# Patient Record
Sex: MALE | Race: WHITE | Employment: FULL TIME | URBAN - METROPOLITAN AREA
[De-identification: names, ages, dates, MRNs, and addresses within clinical notes are randomized per-mention and may not be internally consistent; named-entity substitution may affect disease eponyms.]

---

## 2017-03-22 VITALS — BODY MASS INDEX: 42.66 KG/M2 | HEIGHT: 72 IN | WEIGHT: 315 LBS

## 2017-03-22 RX ORDER — IBUPROFEN 200 MG
200 TABLET ORAL
COMMUNITY
End: 2017-03-30

## 2017-03-22 NOTE — PERIOP NOTES
Patient verified name, , and surgery as listed in [facility]. Type 1B surgery, PAT phone assessment complete. Orders NOT received. Labs per surgeon: No orders received at this time. Labs per anesthesia protocol: None per anesthesia protocol. Patient answered medical/surgical history questions at their best of ability. All prior to admission medications documented in [facility]. Patient instructed to take the following medications the day of surgery according to anesthesia guidelines with a small sip of water: None. Hold all vitamins 7 days prior to surgery and NSAIDS 5 days prior to surgery. Medications to be held: Ibuprofen. Patient instructed on the following and verbalizes understanding:  Arrive at [facility], time of arrival to be called the day before by 1700  NPO after midnight including gum, mints, and ice chips  Responsible adult must drive patient to the hospital, stay during surgery, and patient will  need supervision 24 hours after anesthesia  Use antibacterial soap and Hibiclens in shower the night before surgery and on the morning of surgery  Leave all valuables(money and jewelry) at home but bring insurance card and ID on DOS  Do not wear make-up, nail polish, lotions, cologne, perfumes, powders, or oil on skin.

## 2017-03-23 ENCOUNTER — HOSPITAL ENCOUNTER (OUTPATIENT)
Dept: SURGERY | Age: 37
Discharge: HOME | End: 2017-03-23

## 2017-03-29 ENCOUNTER — ANESTHESIA EVENT (OUTPATIENT)
Dept: SURGERY | Age: 37
End: 2017-03-29
Payer: COMMERCIAL

## 2017-03-29 RX ORDER — FENTANYL CITRATE 50 UG/ML
100 INJECTION, SOLUTION INTRAMUSCULAR; INTRAVENOUS AS NEEDED
Status: CANCELLED | OUTPATIENT
Start: 2017-03-29

## 2017-03-29 RX ORDER — SODIUM CHLORIDE, SODIUM LACTATE, POTASSIUM CHLORIDE, CALCIUM CHLORIDE 600; 310; 30; 20 MG/100ML; MG/100ML; MG/100ML; MG/100ML
1000 INJECTION, SOLUTION INTRAVENOUS CONTINUOUS
Status: CANCELLED | OUTPATIENT
Start: 2017-03-29 | End: 2017-03-30

## 2017-03-29 RX ORDER — SODIUM CHLORIDE 0.9 % (FLUSH) 0.9 %
5-10 SYRINGE (ML) INJECTION AS NEEDED
Status: CANCELLED | OUTPATIENT
Start: 2017-03-29

## 2017-03-29 RX ORDER — SODIUM CHLORIDE 0.9 % (FLUSH) 0.9 %
5-10 SYRINGE (ML) INJECTION EVERY 8 HOURS
Status: CANCELLED | OUTPATIENT
Start: 2017-03-29

## 2017-03-29 RX ORDER — FAMOTIDINE 10 MG/ML
20 INJECTION INTRAVENOUS ONCE
Status: CANCELLED | OUTPATIENT
Start: 2017-03-29 | End: 2017-03-29

## 2017-03-29 RX ORDER — FAMOTIDINE 20 MG/1
20 TABLET, FILM COATED ORAL ONCE
Status: CANCELLED | OUTPATIENT
Start: 2017-03-29 | End: 2017-03-29

## 2017-03-30 ENCOUNTER — ANESTHESIA (OUTPATIENT)
Dept: SURGERY | Age: 37
End: 2017-03-30
Payer: COMMERCIAL

## 2017-03-30 ENCOUNTER — HOSPITAL ENCOUNTER (OUTPATIENT)
Age: 37
Setting detail: OUTPATIENT SURGERY
Discharge: HOME | End: 2017-03-30
Attending: OTOLARYNGOLOGY | Admitting: OTOLARYNGOLOGY
Payer: COMMERCIAL

## 2017-03-30 VITALS
DIASTOLIC BLOOD PRESSURE: 76 MMHG | HEART RATE: 67 BPM | TEMPERATURE: 97.9 F | WEIGHT: 315 LBS | OXYGEN SATURATION: 95 % | RESPIRATION RATE: 16 BRPM | SYSTOLIC BLOOD PRESSURE: 136 MMHG | HEIGHT: 72 IN | BODY MASS INDEX: 42.66 KG/M2

## 2017-03-30 LAB — GLUCOSE BLD STRIP.AUTO-MCNC: 74 MG/DL (ref 65–100)

## 2017-03-30 PROCEDURE — 77030008703 HC TU ET UNCUF COVD -A: Performed by: ANESTHESIOLOGY

## 2017-03-30 PROCEDURE — 77030002916 HC SUT ETHLN J&J -A: Performed by: OTOLARYNGOLOGY

## 2017-03-30 PROCEDURE — 74011000250 HC RX REV CODE- 250

## 2017-03-30 PROCEDURE — 74011250637 HC RX REV CODE- 250/637: Performed by: OTOLARYNGOLOGY

## 2017-03-30 PROCEDURE — 77030011640 HC PAD GRND REM COVD -A: Performed by: OTOLARYNGOLOGY

## 2017-03-30 PROCEDURE — 76210000020 HC REC RM PH II FIRST 0.5 HR: Performed by: OTOLARYNGOLOGY

## 2017-03-30 PROCEDURE — 74011250636 HC RX REV CODE- 250/636: Performed by: ANESTHESIOLOGY

## 2017-03-30 PROCEDURE — 74011250637 HC RX REV CODE- 250/637: Performed by: ANESTHESIOLOGY

## 2017-03-30 PROCEDURE — 74011000250 HC RX REV CODE- 250: Performed by: OTOLARYNGOLOGY

## 2017-03-30 PROCEDURE — 76210000016 HC OR PH I REC 1 TO 1.5 HR: Performed by: OTOLARYNGOLOGY

## 2017-03-30 PROCEDURE — 77030008357 HC SPLNT NSL INT THOM -B: Performed by: OTOLARYNGOLOGY

## 2017-03-30 PROCEDURE — 74011000250 HC RX REV CODE- 250: Performed by: ANESTHESIOLOGY

## 2017-03-30 PROCEDURE — 77030018836 HC SOL IRR NACL ICUM -A: Performed by: OTOLARYNGOLOGY

## 2017-03-30 PROCEDURE — 74011250636 HC RX REV CODE- 250/636

## 2017-03-30 PROCEDURE — 76010000149 HC OR TIME 1 TO 1.5 HR: Performed by: OTOLARYNGOLOGY

## 2017-03-30 PROCEDURE — 77030002888 HC SUT CHRMC J&J -A: Performed by: OTOLARYNGOLOGY

## 2017-03-30 PROCEDURE — 77030028234 HC DEV PEAK PLSMBLD MEDT -B: Performed by: OTOLARYNGOLOGY

## 2017-03-30 PROCEDURE — 76060000033 HC ANESTHESIA 1 TO 1.5 HR: Performed by: OTOLARYNGOLOGY

## 2017-03-30 PROCEDURE — 77030027401 HC DEV TISS COAG PLSMBLD MEDT -C: Performed by: OTOLARYNGOLOGY

## 2017-03-30 PROCEDURE — 82962 GLUCOSE BLOOD TEST: CPT

## 2017-03-30 PROCEDURE — 77030012840 HC ELECTRD COAG SUC CNMD -C: Performed by: OTOLARYNGOLOGY

## 2017-03-30 RX ORDER — FENTANYL CITRATE 50 UG/ML
INJECTION, SOLUTION INTRAMUSCULAR; INTRAVENOUS AS NEEDED
Status: DISCONTINUED | OUTPATIENT
Start: 2017-03-30 | End: 2017-03-30 | Stop reason: HOSPADM

## 2017-03-30 RX ORDER — ROCURONIUM BROMIDE 10 MG/ML
INJECTION, SOLUTION INTRAVENOUS AS NEEDED
Status: DISCONTINUED | OUTPATIENT
Start: 2017-03-30 | End: 2017-03-30 | Stop reason: HOSPADM

## 2017-03-30 RX ORDER — ONDANSETRON 2 MG/ML
INJECTION INTRAMUSCULAR; INTRAVENOUS AS NEEDED
Status: DISCONTINUED | OUTPATIENT
Start: 2017-03-30 | End: 2017-03-30 | Stop reason: HOSPADM

## 2017-03-30 RX ORDER — LIDOCAINE HYDROCHLORIDE 20 MG/ML
INJECTION, SOLUTION EPIDURAL; INFILTRATION; INTRACAUDAL; PERINEURAL AS NEEDED
Status: DISCONTINUED | OUTPATIENT
Start: 2017-03-30 | End: 2017-03-30 | Stop reason: HOSPADM

## 2017-03-30 RX ORDER — SODIUM CHLORIDE 0.9 % (FLUSH) 0.9 %
5-10 SYRINGE (ML) INJECTION AS NEEDED
Status: DISCONTINUED | OUTPATIENT
Start: 2017-03-30 | End: 2017-03-30 | Stop reason: HOSPADM

## 2017-03-30 RX ORDER — BACITRACIN ZINC 500 UNIT/G
OINTMENT (GRAM) TOPICAL AS NEEDED
Status: DISCONTINUED | OUTPATIENT
Start: 2017-03-30 | End: 2017-03-30 | Stop reason: HOSPADM

## 2017-03-30 RX ORDER — OXYCODONE AND ACETAMINOPHEN 5; 325 MG/1; MG/1
1 TABLET ORAL AS NEEDED
Status: DISCONTINUED | OUTPATIENT
Start: 2017-03-30 | End: 2017-03-30 | Stop reason: HOSPADM

## 2017-03-30 RX ORDER — LIDOCAINE HYDROCHLORIDE 10 MG/ML
0.1 INJECTION INFILTRATION; PERINEURAL AS NEEDED
Status: DISCONTINUED | OUTPATIENT
Start: 2017-03-30 | End: 2017-03-30 | Stop reason: SDUPTHER

## 2017-03-30 RX ORDER — SODIUM CHLORIDE, SODIUM LACTATE, POTASSIUM CHLORIDE, CALCIUM CHLORIDE 600; 310; 30; 20 MG/100ML; MG/100ML; MG/100ML; MG/100ML
75 INJECTION, SOLUTION INTRAVENOUS CONTINUOUS
Status: DISCONTINUED | OUTPATIENT
Start: 2017-03-30 | End: 2017-03-30 | Stop reason: HOSPADM

## 2017-03-30 RX ORDER — FAMOTIDINE 20 MG/1
20 TABLET, FILM COATED ORAL ONCE
Status: COMPLETED | OUTPATIENT
Start: 2017-03-30 | End: 2017-03-30

## 2017-03-30 RX ORDER — NALOXONE HYDROCHLORIDE 0.4 MG/ML
0.2 INJECTION, SOLUTION INTRAMUSCULAR; INTRAVENOUS; SUBCUTANEOUS AS NEEDED
Status: DISCONTINUED | OUTPATIENT
Start: 2017-03-30 | End: 2017-03-30 | Stop reason: HOSPADM

## 2017-03-30 RX ORDER — PROPOFOL 10 MG/ML
INJECTION, EMULSION INTRAVENOUS AS NEEDED
Status: DISCONTINUED | OUTPATIENT
Start: 2017-03-30 | End: 2017-03-30 | Stop reason: HOSPADM

## 2017-03-30 RX ORDER — LIDOCAINE HYDROCHLORIDE 10 MG/ML
0.1 INJECTION INFILTRATION; PERINEURAL AS NEEDED
Status: DISCONTINUED | OUTPATIENT
Start: 2017-03-30 | End: 2017-03-30 | Stop reason: HOSPADM

## 2017-03-30 RX ORDER — SUCCINYLCHOLINE CHLORIDE 20 MG/ML
INJECTION INTRAMUSCULAR; INTRAVENOUS AS NEEDED
Status: DISCONTINUED | OUTPATIENT
Start: 2017-03-30 | End: 2017-03-30 | Stop reason: HOSPADM

## 2017-03-30 RX ORDER — DEXAMETHASONE SODIUM PHOSPHATE 4 MG/ML
INJECTION, SOLUTION INTRA-ARTICULAR; INTRALESIONAL; INTRAMUSCULAR; INTRAVENOUS; SOFT TISSUE AS NEEDED
Status: DISCONTINUED | OUTPATIENT
Start: 2017-03-30 | End: 2017-03-30 | Stop reason: HOSPADM

## 2017-03-30 RX ORDER — MIDAZOLAM HYDROCHLORIDE 1 MG/ML
2 INJECTION, SOLUTION INTRAMUSCULAR; INTRAVENOUS
Status: DISCONTINUED | OUTPATIENT
Start: 2017-03-30 | End: 2017-03-30 | Stop reason: HOSPADM

## 2017-03-30 RX ORDER — HYDROMORPHONE HYDROCHLORIDE 2 MG/ML
0.5 INJECTION, SOLUTION INTRAMUSCULAR; INTRAVENOUS; SUBCUTANEOUS
Status: DISCONTINUED | OUTPATIENT
Start: 2017-03-30 | End: 2017-03-30 | Stop reason: HOSPADM

## 2017-03-30 RX ORDER — ACETAMINOPHEN 500 MG
500 TABLET ORAL ONCE
Status: DISCONTINUED | OUTPATIENT
Start: 2017-03-30 | End: 2017-03-30 | Stop reason: HOSPADM

## 2017-03-30 RX ORDER — DIPHENHYDRAMINE HYDROCHLORIDE 50 MG/ML
12.5 INJECTION, SOLUTION INTRAMUSCULAR; INTRAVENOUS ONCE
Status: DISCONTINUED | OUTPATIENT
Start: 2017-03-30 | End: 2017-03-30 | Stop reason: HOSPADM

## 2017-03-30 RX ORDER — ONDANSETRON 2 MG/ML
4 INJECTION INTRAMUSCULAR; INTRAVENOUS ONCE
Status: DISCONTINUED | OUTPATIENT
Start: 2017-03-30 | End: 2017-03-30 | Stop reason: HOSPADM

## 2017-03-30 RX ORDER — OXYMETAZOLINE HCL 0.05 %
SPRAY, NON-AEROSOL (ML) NASAL AS NEEDED
Status: DISCONTINUED | OUTPATIENT
Start: 2017-03-30 | End: 2017-03-30 | Stop reason: HOSPADM

## 2017-03-30 RX ORDER — LIDOCAINE HYDROCHLORIDE AND EPINEPHRINE 10; 10 MG/ML; UG/ML
INJECTION, SOLUTION INFILTRATION; PERINEURAL AS NEEDED
Status: DISCONTINUED | OUTPATIENT
Start: 2017-03-30 | End: 2017-03-30 | Stop reason: HOSPADM

## 2017-03-30 RX ADMIN — SODIUM CHLORIDE, SODIUM LACTATE, POTASSIUM CHLORIDE, AND CALCIUM CHLORIDE 75 ML/HR: 600; 310; 30; 20 INJECTION, SOLUTION INTRAVENOUS at 06:30

## 2017-03-30 RX ADMIN — FENTANYL CITRATE 50 MCG: 50 INJECTION, SOLUTION INTRAMUSCULAR; INTRAVENOUS at 08:00

## 2017-03-30 RX ADMIN — ONDANSETRON 4 MG: 2 INJECTION INTRAMUSCULAR; INTRAVENOUS at 07:45

## 2017-03-30 RX ADMIN — OXYCODONE HYDROCHLORIDE AND ACETAMINOPHEN 1 TABLET: 5; 325 TABLET ORAL at 09:28

## 2017-03-30 RX ADMIN — FAMOTIDINE 20 MG: 20 TABLET, FILM COATED ORAL at 06:30

## 2017-03-30 RX ADMIN — HYDROMORPHONE HYDROCHLORIDE 0.5 MG: 2 INJECTION, SOLUTION INTRAMUSCULAR; INTRAVENOUS; SUBCUTANEOUS at 08:48

## 2017-03-30 RX ADMIN — LIDOCAINE HYDROCHLORIDE 100 MG: 20 INJECTION, SOLUTION EPIDURAL; INFILTRATION; INTRACAUDAL; PERINEURAL at 07:27

## 2017-03-30 RX ADMIN — SODIUM CHLORIDE, SODIUM LACTATE, POTASSIUM CHLORIDE, AND CALCIUM CHLORIDE: 600; 310; 30; 20 INJECTION, SOLUTION INTRAVENOUS at 07:51

## 2017-03-30 RX ADMIN — MIDAZOLAM HYDROCHLORIDE 2 MG: 1 INJECTION, SOLUTION INTRAMUSCULAR; INTRAVENOUS at 07:05

## 2017-03-30 RX ADMIN — PROPOFOL 200 MG: 10 INJECTION, EMULSION INTRAVENOUS at 07:27

## 2017-03-30 RX ADMIN — HYDROMORPHONE HYDROCHLORIDE 0.5 MG: 2 INJECTION, SOLUTION INTRAMUSCULAR; INTRAVENOUS; SUBCUTANEOUS at 08:54

## 2017-03-30 RX ADMIN — SUCCINYLCHOLINE CHLORIDE 200 MG: 20 INJECTION INTRAMUSCULAR; INTRAVENOUS at 07:27

## 2017-03-30 RX ADMIN — LIDOCAINE HYDROCHLORIDE 0.1 ML: 10 INJECTION, SOLUTION INFILTRATION; PERINEURAL at 06:29

## 2017-03-30 RX ADMIN — DEXAMETHASONE SODIUM PHOSPHATE 10 MG: 4 INJECTION, SOLUTION INTRA-ARTICULAR; INTRALESIONAL; INTRAMUSCULAR; INTRAVENOUS; SOFT TISSUE at 07:45

## 2017-03-30 RX ADMIN — FENTANYL CITRATE 100 MCG: 50 INJECTION, SOLUTION INTRAMUSCULAR; INTRAVENOUS at 07:17

## 2017-03-30 RX ADMIN — ROCURONIUM BROMIDE 5 MG: 10 INJECTION, SOLUTION INTRAVENOUS at 07:27

## 2017-03-30 NOTE — DISCHARGE INSTRUCTIONS
Nasal Septum Repair: What to Expect at [address] may have some swelling of your nose, upper lip, cheeks, or around your eyes after nasal surgery. You may have some bruises around your nose and eyes. Your nose may be sore and will bleed. This may last for several days after surgery. The tip of your nose and your upper lip and gums may be numb. Feeling will return in a few weeks to a few months. Your sense of smell may not be as good after surgery. But it will improve and will often return to normal in 1 to 2 months. You will have a drip pad under your nose to collect mucus and blood. Change it only when it bleeds through. You may have to do this every hour for 24 hours after surgery. You will probably be able to return to work or school in a few days and to your normal routine in about 3 weeks. But this varies with your job and how much surgery you had. Most people recover fully in 1 to 2 months. You will have to visit your doctor during the 3 to 4 months after your surgery. Your doctor will check to see that your nose is healing well. This care sheet gives you a general idea about how long it will take for you to recover. But each person recovers at a different pace. Follow the steps below to get better as quickly as possible. How can you care for yourself at home? Activity  · Rest when you feel tired. Getting enough sleep will help you recover. Do not lie flat. Raise your head with two or three pillows. This can reduce swelling. Try to sleep on your back for the month after surgery. You can also sleep in a reclining chair. · Try to walk each day. Start by walking a little more than you did the day before. Bit by bit, increase the amount you walk. Walking boosts blood flow and helps prevent pneumonia and constipation. Also, try to sit and stand as much as you can. · For 1 week, try not to bend over or lift anything heavier than 10 pounds.  This may include a child, heavy grocery bags and milk containers, a heavy briefcase or backpack, cat litter or dog food bags, or a vacuum . · You can take a shower or bath. Avoid swimming for 6 weeks. · Avoid strenuous activities, such as bicycle riding, jogging, weight lifting, or aerobic exercise, for 1 week or until your doctor says it is okay. · You may drive when you are no longer taking prescription pain pills and feel up to it. Diet  · You can eat your normal diet. If your stomach is upset, try bland, low-fat foods like plain rice, broiled chicken, toast, and yogurt. · You may notice that your bowel movements are not regular right after your surgery. This is common. Try to avoid constipation and straining with bowel movements. You may want to take a fiber supplement every day. If you have not had a bowel movement after a couple of days, ask your doctor about taking a mild laxative. Medicines  · Your doctor will tell you if and when you can restart your medicines. He or she will also give you instructions about taking any new medicines. · If you take blood thinners, such as warfarin (Coumadin), clopidogrel (Plavix), or aspirin, be sure to talk to your doctor. He or she will tell you if and when to start taking those medicines again. Make sure that you understand exactly what your doctor wants you to do. · Do not take aspirin, aspirin-containing medicines, or anti-inflammatory medicines such as ibuprofen (Advil, Motrin) or naproxen (Aleve) for 3 weeks following surgery unless your doctor says it is okay. · Take pain medicines exactly as directed. ¨ If the doctor gave you a prescription medicine for pain, take it as prescribed. ¨ Do not take two or more pain medicines at the same time unless the doctor told you to. Many pain medicines have acetaminophen, which is Tylenol. Too much acetaminophen (Tylenol) can be harmful. · If your doctor prescribed antibiotics, take them as directed. Do not stop taking them just because you feel better.  You need to take the full course of antibiotics. · If you think your pain medicine is making you sick to your stomach:  ¨ Take your medicine after meals (unless your doctor has told you not to). ¨ Ask your doctor for a different pain medicine. Incision care  · You will have a drip pad under your nose to collect blood. Change it only when it has bled through. You may have to do this every hour for 24 hours after surgery. When bleeding stops, you can remove it. · If you have packing in your nose, leave it in. Your doctor will take it out. Ice and elevation  · To help with swelling and pain, put ice or a cold pack on your nose for 10 to 20 minutes at a time. Put a thin cloth between the ice and your skin. · Sleep with your head raised up. You can also sleep in a reclining chair. Other instructions  · Do not blow your nose for 1 week after surgery. · Do not put anything into your nose. · If you must sneeze, open your mouth and sneeze naturally. · After any packing is removed, use saline (saltwater) nasal washes to help keep your nasal passages open and wash out mucus and bacteria. You can buy saline nose drops at a grocery store or drugstore. Or you can make your own at home by adding 1 teaspoon of salt and 1 teaspoon of baking soda to 2 cups of distilled water. If you make your own, fill a bulb syringe with the solution, insert the tip into your nostril, and squeeze gently. [place] your nose. · You can wear your glasses when you wish. Do not wear contacts until the day after the surgery. Follow-up care is a key part of your treatment and safety. Be sure to make and go to all appointments, and call your doctor if you are having problems. It's also a good idea to know your test results and keep a list of the medicines you take. When should you call for help? Call 911 anytime you think you may need emergency care. For example, call if:  · You passed out (lost consciousness).   · You have severe trouble breathing. Call your doctor now or seek immediate medical care if:  · The dressing soaks through with blood and needs to be changed more than every 15 minutes. · You have a fever with a stiff neck or a severe headache. · You are sensitive to light, or you feel very sleepy or confused. · You have pain that does not get better after you take pain pills. · You have a fever over 100°F.  · You have double or blurred vision, cannot close your eyes, or have eye pain. Watch closely for any changes in your health, and be sure to contact your doctor if:  · You do not have a bowel movement after taking a laxative. Where can you learn more? Go to http://[facility].info/. Enter K733 in the search box to learn more about \"Nasal Septum Repair: What to Expect at Home. \"  Current as of: July 29, 2016  Content Version: 11.2  © 7645-3742 [employer]. Care instructions adapted under license by [employer] (which disclaims liability or warranty for this information). If you have questions about a medical condition or this instruction, always ask your healthcare professional. [place] [address] any warranty or liability for your use of this information. Tonsillectomy: What to Expect at Home  Your Recovery  A tonsillectomy is surgery to remove the tonsils. Sometimes the adenoids are removed during the same surgery. The tonsils and adenoids are in the throat. Your doctor did the surgery through your mouth. Most adults have a lot of throat pain for 1 to 2 weeks or longer. The pain may get worse before it gets better. The pain in your throat can also make your ears hurt. You may have good days and bad days. Most people find that they have the most pain in the first 8 days. You probably will feel tired for 1 to 2 weeks. You may have bad breath for up to 2 weeks. You may be able to go back to work or your usual routine in 1 to 2 weeks.   There will be a white coating in your throat where the tonsils were. The coating is like a scab, and it usually starts to come off in 5 to 10 days. It is usually gone in 10 to 16 days. You may see some blood in your spit as the coating comes off. After surgery, you may snore or breathe through your mouth at night. This usually gets better 1 to 2 weeks after surgery. Mouth breathing can make your mouth and throat dry or sore. Place a humidifier by your bed when you sleep. This may make it easier for you to breathe. Follow the directions for cleaning the machine. At first, your voice may sound different. Your voice probably will return to normal in 2 to 6 weeks. It is common for people to lose weight after this surgery, because it can hurt to swallow food at first. As long as you are drinking plenty of liquids, this is okay. You will probably gain the weight back when you begin to eat normally again. This care sheet gives you a general idea about how long it will take for you to recover. But each person recovers at a different pace. Follow the steps below to get better as quickly as possible. How can you care for yourself at home? Activity  · Rest when you feel tired. Getting enough sleep will help you recover. · Try to walk each day. Start by walking a little more than you did the day before. Bit by bit, increase the amount you walk. Walking boosts blood flow and helps prevent pneumonia and constipation. · Avoid strenuous activities, such as bicycle riding, jogging, weight lifting, or aerobic exercise, for 2 weeks or until your doctor says it is okay. · For 2 weeks, avoid lifting anything that would make you strain. This may include a child, heavy grocery bags and milk containers, a heavy briefcase or backpack, cat litter or dog food bags, or a vacuum . · Avoid dirt, dust, and heat for 2 weeks after surgery. These things can irritate your throat.   · For about 1 week, try to avoid crowds or people who you know have a cold or the flu. This can help prevent you from getting an infection. · You may bathe as usual.  · Ask your doctor when you can drive again. · You will probably need to take 1 to 2 weeks off from work. It depends on the type of work you do and how you feel. Diet  · Drink plenty of fluids to avoid becoming dehydrated. · If it is painful to swallow, start out with Popsicles, ice cream, or cold or room-temperature drinks. Do not eat or drink red food items, such as red juice or red Jell-O. The color may make you think you are bleeding. Avoid hot drinks, soda pop, orange or tomato juice, and other acidic foods that can sting the throat. These may make throat pain worse and cause bleeding. · For 2 weeks, choose soft foods like pudding, yogurt, canned or cooked fruit, scrambled eggs, and mashed potatoes. Avoid eating hard or scratchy foods like chips or raw vegetables. · You may notice that your bowel movements are not regular right after your surgery. This is common. Try to avoid constipation and straining with bowel movements. You may want to take a fiber supplement every day. If you have not had a bowel movement after a couple of days, ask your doctor about taking a mild laxative. Medicines  · Your doctor will tell you if and when you can restart your medicines. He or she will also give you instructions about taking any new medicines. · If you take blood thinners, such as warfarin (Coumadin), clopidogrel (Plavix), or aspirin, be sure to talk to your doctor. He or she will tell you if and when to start taking those medicines again. Make sure that you understand exactly what your doctor wants you to do. · Be safe with medicines. Take pain medicines exactly as directed. ¨ If the doctor gave you a prescription medicine for pain, take it as prescribed. ¨ If you are not taking a prescription pain medicine, ask your doctor if you can take an over-the-counter medicine.   · If you think your pain medicine is making you sick to your stomach:  ¨ Take your pain medicine after meals (unless your doctor has told you not to). ¨ Ask your doctor for a different pain medicine. · If your doctor prescribed antibiotics, take them as directed. Do not stop taking them just because you feel better. You need to take the full course of antibiotics. Follow-up care is a key part of your treatment and safety. Be sure to make and go to all appointments, and call your doctor if you are having problems. It's also a good idea to know your test results and keep a list of the medicines you take. When should you call for help? Call 911 anytime you think you may need emergency care. For example, call if:  · You passed out (lost consciousness). · You have severe trouble breathing. Call your doctor now or seek immediate medical care if:  · You bleed from your mouth or nose. · You vomit blood. · You are sick to your stomach or cannot keep fluids down. · You have pain that does not get better after you take pain medicine. · You have a fever. · Your pain gets much worse. · You have signs of needing more fluids. You have sunken eyes and a dry mouth, and you pass only a little dark urine. Watch closely for changes in your health, and be sure to contact your doctor if you have any problems. Where can you learn more? Go to http://aleena-andrei.info/. Enter M163 in the search box to learn more about \"Tonsillectomy: What to Expect at Home. \"  Current as of: July 29, 2016  Content Version: 11.2  © 0180-7025 [employer]. Care instructions adapted under license by [employer] (which disclaims liability or warranty for this information). If you have questions about a medical condition or this instruction, always ask your healthcare professional. [address] any warranty or liability for your use of this information.

## 2017-03-30 NOTE — IP AVS SNAPSHOT
Enejamel Faizan 
 
 
 [address] 
[phone] Patient: Isaías Aguilar MRN: [id number] You are allergic to the following Allergen Reactions Morphine Other (comments) \"react strongly to it and was out of it for 2 days\" Pcn (Penicillins) Rash Recent Documentation Height Weight BMI Smoking Status 1.829 m (!) 163.5 kg 48.88 kg/m2 Never Smoker Emergency Contacts Name Discharge Info Relation Home Work Mobile Marci Finley  Spouse [3] [phone] About your hospitalization You were admitted on:  March 30, 2017 You last received care in the:  [address] [place] [address] PACU You were discharged on:  March 30, 2017 Unit phone number:  [phone] Why you were hospitalized Your primary diagnosis was:  Not on File Providers Seen During Your Hospitalizations Provider Role Specialty Primary office phone Hellen Quiroz MD Attending Provider Otolaryngology [phone] Your Primary Care Physician (PCP) Primary Care Physician Office Phone Office Fax Jean Paul Del Torolist [phone] Follow-up Information Follow up With Details Comments Contact Info Hellen Quiroz MD Go on 4/4/2017 at 9:55 AM 
 [address] [place] [address]  
[phone] Cyrus Ralph MD   [address] [place] [address] 
[phone] Your Appointments Tuesday April 04, 2017  9:55 AM EDT  
POST OP with Hellen Quiroz MD  
[facility]) [address] [place] [address],[address] [place] [address]  [id number] [phone] Current Discharge Medication List  
  
CONTINUE these medications which have NOT CHANGED Dose & Instructions Dispensing Information Comments Morning Noon Evening Bedtime  TYLENOL PM EXTRA STRENGTH PO  
   
 Your last dose was: Your next dose is:    
   
   
 Dose:  500 mg Take 500 mg by mouth as needed. Refills:  0 STOP taking these medications   
 ibuprofen 200 mg tablet Commonly known as:  MOTRIN Discharge Instructions Nasal Septum Repair: What to Expect at Home Your Recovery You may have some swelling of your nose, upper lip, cheeks, or around your eyes after nasal surgery. You may have some bruises around your nose and eyes. Your nose may be sore and will bleed. This may last for several days after surgery. The tip of your nose and your upper lip and gums may be numb. Feeling will return in a few weeks to a few months. Your sense of smell may not be as good after surgery. But it will improve and will often return to normal in 1 to 2 months. You will have a drip pad under your nose to collect mucus and blood. Change it only when it bleeds through. You may have to do this every hour for 24 hours after surgery. You will probably be able to return to work or school in a few days and to your normal routine in about 3 weeks. But this varies with your job and how much surgery you had. Most people recover fully in 1 to 2 months. You will have to visit your doctor during the 3 to 4 months after your surgery. Your doctor will check to see that your nose is healing well. This care sheet gives you a general idea about how long it will take for you to recover. But each person recovers at a different pace. Follow the steps below to get better as quickly as possible. How can you care for yourself at home? Activity · Rest when you feel tired. Getting enough sleep will help you recover. Do not lie flat. Raise your head with two or three pillows. This can reduce swelling. Try to sleep on your back for the month after surgery. You can also sleep in a reclining chair. · Try to walk each day.  Start by walking a little more than you did the day before. Bit by bit, increase the amount you walk. Walking boosts blood flow and helps prevent pneumonia and constipation. Also, try to sit and stand as much as you can. · For 1 week, try not to bend over or lift anything heavier than 10 pounds. This may include a child, heavy grocery bags and milk containers, a heavy briefcase or backpack, cat litter or dog food bags, or a vacuum . · You can take a shower or bath. Avoid swimming for 6 weeks. · Avoid strenuous activities, such as bicycle riding, jogging, weight lifting, or aerobic exercise, for 1 week or until your doctor says it is okay. · You may drive when you are no longer taking prescription pain pills and feel up to it. Diet · You can eat your normal diet. If your stomach is upset, try bland, low-fat foods like plain rice, broiled chicken, toast, and yogurt. · You may notice that your bowel movements are not regular right after your surgery. This is common. Try to avoid constipation and straining with bowel movements. You may want to take a fiber supplement every day. If you have not had a bowel movement after a couple of days, ask your doctor about taking a mild laxative. Medicines · Your doctor will tell you if and when you can restart your medicines. He or she will also give you instructions about taking any new medicines. · If you take blood thinners, such as warfarin (Coumadin), clopidogrel (Plavix), or aspirin, be sure to talk to your doctor. He or she will tell you if and when to start taking those medicines again. Make sure that you understand exactly what your doctor wants you to do. · Do not take aspirin, aspirin-containing medicines, or anti-inflammatory medicines such as ibuprofen (Advil, Motrin) or naproxen (Aleve) for 3 weeks following surgery unless your doctor says it is okay. · Take pain medicines exactly as directed. ¨ If the doctor gave you a prescription medicine for pain, take it as prescribed. ¨ Do not take two or more pain medicines at the same time unless the doctor told you to. Many pain medicines have acetaminophen, which is Tylenol. Too much acetaminophen (Tylenol) can be harmful. · If your doctor prescribed antibiotics, take them as directed. Do not stop taking them just because you feel better. You need to take the full course of antibiotics. · If you think your pain medicine is making you sick to your stomach: 
¨ Take your medicine after meals (unless your doctor has told you not to). ¨ Ask your doctor for a different pain medicine. Incision care · You will have a drip pad under your nose to collect blood. Change it only when it has bled through. You may have to do this every hour for 24 hours after surgery. When bleeding stops, you can remove it. · If you have packing in your nose, leave it in. Your doctor will take it out. Ice and elevation · To help with swelling and pain, put ice or a cold pack on your nose for 10 to 20 minutes at a time. Put a thin cloth between the ice and your skin. · Sleep with your head raised up. You can also sleep in a reclining chair. Other instructions · Do not blow your nose for 1 week after surgery. · Do not put anything into your nose. · If you must sneeze, open your mouth and sneeze naturally. · After any packing is removed, use saline (saltwater) nasal washes to help keep your nasal passages open and wash out mucus and bacteria. You can buy saline nose drops at a grocery store or drugstore. Or you can make your own at home by adding 1 teaspoon of salt and 1 teaspoon of baking soda to 2 cups of distilled water. If you make your own, fill a bulb syringe with the solution, insert the tip into your nostril, and squeeze gently. [place] Mace your nose. · You can wear your glasses when you wish. Do not wear contacts until the day after the surgery. Follow-up care is a key part of your treatment and safety.  Be sure to make and go to all appointments, and call your doctor if you are having problems. It's also a good idea to know your test results and keep a list of the medicines you take. When should you call for help? Call 911 anytime you think you may need emergency care. For example, call if: 
· You passed out (lost consciousness). · You have severe trouble breathing. Call your doctor now or seek immediate medical care if: · The dressing soaks through with blood and needs to be changed more than every 15 minutes. · You have a fever with a stiff neck or a severe headache. · You are sensitive to light, or you feel very sleepy or confused. · You have pain that does not get better after you take pain pills. · You have a fever over 100°F. 
· You have double or blurred vision, cannot close your eyes, or have eye pain. Watch closely for any changes in your health, and be sure to contact your doctor if: 
· You do not have a bowel movement after taking a laxative. Where can you learn more? Go to http://aleena-andrei.info/. Enter X595 in the search box to learn more about \"Nasal Septum Repair: What to Expect at Home. \" Current as of: July 29, 2016 Content Version: 11.2 © 8252-6708 [employer]. Care instructions adapted under license by [employer] (which disclaims liability or warranty for this information). If you have questions about a medical condition or this instruction, always ask your healthcare professional. [place] [address] any warranty or liability for your use of this information. Tonsillectomy: What to Expect at [facility] Your Recovery A tonsillectomy is surgery to remove the tonsils. Sometimes the adenoids are removed during the same surgery. The tonsils and adenoids are in the throat. Your doctor did the surgery through your mouth. Most adults have a lot of throat pain for 1 to 2 weeks or longer.  The pain may get worse before it gets better. The pain in your throat can also make your ears hurt. You may have good days and bad days. Most people find that they have the most pain in the first 8 days. You probably will feel tired for 1 to 2 weeks. You may have bad breath for up to 2 weeks. You may be able to go back to work or your usual routine in 1 to 2 weeks. There will be a white coating in your throat where the tonsils were. The coating is like a scab, and it usually starts to come off in 5 to 10 days. It is usually gone in 10 to 16 days. You may see some blood in your spit as the coating comes off. After surgery, you may snore or breathe through your mouth at night. This usually gets better 1 to 2 weeks after surgery. Mouth breathing can make your mouth and throat dry or sore. Place a humidifier by your bed when you sleep. This may make it easier for you to breathe. Follow the directions for cleaning the machine. At first, your voice may sound different. Your voice probably will return to normal in 2 to 6 weeks. It is common for people to lose weight after this surgery, because it can hurt to swallow food at first. As long as you are drinking plenty of liquids, this is okay. You will probably gain the weight back when you begin to eat normally again. This care sheet gives you a general idea about how long it will take for you to recover. But each person recovers at a different pace. Follow the steps below to get better as quickly as possible. How can you care for yourself at home? Activity · Rest when you feel tired. Getting enough sleep will help you recover. · Try to walk each day. Start by walking a little more than you did the day before. Bit by bit, increase the amount you walk. Walking boosts blood flow and helps prevent pneumonia and constipation. · Avoid strenuous activities, such as bicycle riding, jogging, weight lifting, or aerobic exercise, for 2 weeks or until your doctor says it is okay. · For 2 weeks, avoid lifting anything that would make you strain. This may include a child, heavy grocery bags and milk containers, a heavy briefcase or backpack, cat litter or dog food bags, or a vacuum . · Avoid dirt, dust, and heat for 2 weeks after surgery. These things can irritate your throat. · For about 1 week, try to avoid crowds or people who you know have a cold or the flu. This can help prevent you from getting an infection. · You may bathe as usual. 
· Ask your doctor when you can drive again. · You will probably need to take 1 to 2 weeks off from work. It depends on the type of work you do and how you feel. Diet · Drink plenty of fluids to avoid becoming dehydrated. · If it is painful to swallow, start out with Popsicles, ice cream, or cold or room-temperature drinks. Do not eat or drink red food items, such as red juice or red Jell-O. The color may make you think you are bleeding. Avoid hot drinks, soda pop, orange or tomato juice, and other acidic foods that can sting the throat. These may make throat pain worse and cause bleeding. · For 2 weeks, choose soft foods like pudding, yogurt, canned or cooked fruit, scrambled eggs, and mashed potatoes. Avoid eating hard or scratchy foods like chips or raw vegetables. · You may notice that your bowel movements are not regular right after your surgery. This is common. Try to avoid constipation and straining with bowel movements. You may want to take a fiber supplement every day. If you have not had a bowel movement after a couple of days, ask your doctor about taking a mild laxative. Medicines · Your doctor will tell you if and when you can restart your medicines. He or she will also give you instructions about taking any new medicines. · If you take blood thinners, such as warfarin (Coumadin), clopidogrel (Plavix), or aspirin, be sure to talk to your doctor.  He or she will tell you if and when to start taking those medicines again. Make sure that you understand exactly what your doctor wants you to do. · Be safe with medicines. Take pain medicines exactly as directed. ¨ If the doctor gave you a prescription medicine for pain, take it as prescribed. ¨ If you are not taking a prescription pain medicine, ask your doctor if you can take an over-the-counter medicine. · If you think your pain medicine is making you sick to your stomach: 
¨ Take your pain medicine after meals (unless your doctor has told you not to). ¨ Ask your doctor for a different pain medicine. · If your doctor prescribed antibiotics, take them as directed. Do not stop taking them just because you feel better. You need to take the full course of antibiotics. Follow-up care is a key part of your treatment and safety. Be sure to make and go to all appointments, and call your doctor if you are having problems. It's also a good idea to know your test results and keep a list of the medicines you take. When should you call for help? Call 911 anytime you think you may need emergency care. For example, call if: 
· You passed out (lost consciousness). · You have severe trouble breathing. Call your doctor now or seek immediate medical care if: 
· You bleed from your mouth or nose. · You vomit blood. · You are sick to your stomach or cannot keep fluids down. · You have pain that does not get better after you take pain medicine. · You have a fever. · Your pain gets much worse. · You have signs of needing more fluids. You have sunken eyes and a dry mouth, and you pass only a little dark urine. Watch closely for changes in your health, and be sure to contact your doctor if you have any problems. Where can you learn more? Go to http://aleena-andrei.info/. Enter N974 in the search box to learn more about \"Tonsillectomy: What to Expect at Home. \" Current as of: July 29, 2016 Content Version: 11.2 © 5839-3662 [employer]. Care instructions adapted under license by [employer] (which disclaims liability or warranty for this information). If you have questions about a medical condition or this instruction, always ask your healthcare professional. [address] any warranty or liability for your use of this information. Discharge Orders None Introducing \[facility] [place] [facility]\"" & HEALTH SERVICES! Dear Darrick Lopez: Thank you for requesting a [facility]IAN account. Our records indicate that you already have an active ACSIAN account. You can access your account anytime at [email or web address] Did you know that you can access your hospital and ER discharge instructions at any time in ACSIAN? You can also review all of your test results from your hospital stay or ER visit. Additional Information If you have questions, please visit the Frequently Asked Questions section of the ACSIAN website at [email or web address] Remember, ACSIAN is NOT to be used for urgent needs. For medical emergencies, dial 911. Now available from your iPhone and Android! General Information Please provide this summary of care documentation to your next provider. Patient Signature:  ____________________________________________________________ Date:  ____________________________________________________________  
  
Mert Search Provider Signature:  ____________________________________________________________ Date:  ____________________________________________________________

## 2017-03-30 NOTE — BRIEF OP NOTE
BRIEF OPERATIVE NOTE    Date of Procedure: 3/30/2017   Preoperative Diagnosis: Chronic tonsillitis [J35.01]  Deviated septum [J34.2]  Upper airway resistance syndrome [G47.8]  Postoperative Diagnosis: Chronic tonsillitis [J35.01]  Deviated septum [J34.2]  Upper airway resistance syndrome [G47.8]    Procedure(s):  SEPTOPLASTY  TONSILLECTOMY  TURBINATE REDUCTION/ SMR  Surgeon(s) and Role:     * Judith Judge MD - Primary            Surgical Staff:  Circ-1: Vale Jackson RN  Circ-2: Jc Brunson RN  Scrub Tech-1: Jered Voss  Scrub Tech-2: Katie Pierce  Event Time In   Incision Start 1408   Incision Close 0824     Anesthesia: General   Estimated Blood Loss: 20 cc  Specimens: * No specimens in log *   Findings: nasal obstruction, oth   Complications: none  Implants: * No implants in log *

## 2017-03-30 NOTE — ANESTHESIA POSTPROCEDURE EVALUATION
Post-Anesthesia Evaluation and Assessment    Patient: Phyllis Puckett MRN: [id number]  SSN: [id number]    YOB: 1980  Age: 43 y.o. Sex: male       Cardiovascular Function/Vital Signs  Visit Vitals    /56    Pulse (!) 58    Temp 36.6 °C (97.9 °F)    Resp 16    Ht 6' (1.829 m)    Wt (!) 163.5 kg (360 lb 6.4 oz)    SpO2 97%    BMI 48.88 kg/m2       Patient is status post general anesthesia for Procedure(s):  SEPTOPLASTY  TONSILLECTOMY  TURBINATE REDUCTION/ SMR. Nausea/Vomiting: None    Postoperative hydration reviewed and adequate. Pain:  Pain Scale 1: Numeric (0 - 10) (03/30/17 0928)  Pain Intensity 1: 5 (03/30/17 0928)   Managed    Neurological Status:   Neuro (WDL): Exceptions to [facility] (03/30/17 9037)  Neuro  Neurologic State: Drowsy (03/30/17 5513)  Cognition: Follows commands (03/30/17 0923)  LUE Motor Response: Purposeful (03/30/17 0923)  LLE Motor Response: Purposeful (03/30/17 0923)  RUE Motor Response: Purposeful (03/30/17 8433)  RLE Motor Response: Purposeful (03/30/17 0923)   At baseline    Mental Status and Level of Consciousness: Arousable    Pulmonary Status:   O2 Device: Room air (03/30/17 0923)   Adequate oxygenation and airway patent    Complications related to anesthesia: None    Post-anesthesia assessment completed.  No concerns    Signed By: Ponce Leonard MD     March 30, 2017

## 2017-03-30 NOTE — ANESTHESIA PREPROCEDURE EVALUATION
Anesthetic History   No history of anesthetic complications            Review of Systems / Medical History  Patient summary reviewed, nursing notes reviewed and pertinent labs reviewed    Pulmonary        Sleep apnea: No treatment           Neuro/Psych         Headaches     Cardiovascular    Hypertension: well controlled              Exercise tolerance: >4 METS     GI/Hepatic/Renal     GERD: well controlled           Endo/Other    Diabetes: poorly controlled, type 2    Morbid obesity     Other Findings   Comments: [place]           Physical Exam    Airway  Mallampati: I  TM Distance: 4 - 6 cm  Neck ROM: normal range of motion   Mouth opening: Normal    Comments: Full beard Cardiovascular    Rhythm: regular  Rate: normal         Dental  No notable dental hx       Pulmonary  Breath sounds clear to auscultation               Abdominal         Other Findings            Anesthetic Plan    ASA: 3  Anesthesia type: general          Induction: Intravenous and RSI  Anesthetic plan and risks discussed with: Patient

## 2017-03-31 NOTE — OP NOTES
[facility] 63   OPERATIVE REPORT       Name:  Keven Rider   MR#:  [id number]   :  1980   Account #:  [de-identified]   Date of Adm:  2017       DATE OF SURGERY: 2017    PREOPERATIVE DIAGNOSES   1. Chronic nasal obstruction. 2. Chronic tonsillitis with tonsillar hypertrophy. POSTOPERATIVE DIAGNOSES   1. Chronic nasal obstruction. 2. Chronic tonsillitis with tonsillar hypertrophy. OPERATION PERFORMED   1. Septoplasty. 2. Submucous resection, bilateral inferior turbinates. 3. Tonsillectomy. SURGEON: Jenna Mclean. Sherley Kevin MD    ASSISTANT: No assistant. ANESTHESIA: General endotracheal.    INDICATIONS FOR SURGERY: A 64-year-old male who presented with   history of long-standing nasal obstruction associated with a   rather severe obstructing septal deformity with compensatory   turbinate hypertrophy, and with evidence of chronic tonsillitis   associated with chronic tonsillar hypertrophy. The patient was   scheduled for elective surgical intervention. OPERATION: The patient was taken to the operating room where he   underwent general endotracheal anesthesia. He was prepped and   draped in the usual fashion for operative approach to the nasal   and oral cavity. Initially, local anesthesia was administered to   the septum using 1% Xylocaine with 1:100,000 epinephrine. A left   hemitransfixion incision was made with a 15 blade, and a left   mucoperichondrial and mucoperiosteal flap elevated without   difficulty. The bony cartilaginous junction was  and a   rather severe bony reflection taken down with a combination of   back-biting forceps and microdebrider. A large bony spur off the   maxillary crest to the left side was taken down with a 4 mm   osteotome. With the septum now in the midline, hemitransfixion   incision was closed using interrupted 5-0 chromic suture. A 4-0   plain quilting stitch was then placed without difficulty.     Attention was directed to the inferior turbinates which   demonstrated marked compensatory hypertrophy. A submucous   resection was undertaken utilizing the microdebrider technique. The remaining portion of the turbinates were outfractured with   the [facility]. Bilateral Lara splints were then positioned   which were coated in bacitracin ointment. These were held in   place with a 3-0 nylon suture. The patient was then positioned   for approach to the oral cavity. A Cailin-Jordon mouth gag was introduced and the right tonsil   grasped with a curved Allis. With medial traction, dissection   was carried out with the coblator on the setting of 6. In a   similar fashion, the opposite tonsil was also removed. Hemostasis was obtained with the coblator on a setting of 3. After confirming hemostasis in the nasopharynx, the procedure   was terminated. The patient was awakened, extubated, and taken to the recovery   room in a stable fashion. The patient tolerated the procedure   well, with no complications.         MD Pedro Mills   D:  03/31/2017   12:46   T:  03/31/2017   14:09   Job #:  [id number]

## 2017-06-05 ENCOUNTER — HOSPITAL ENCOUNTER (OUTPATIENT)
Dept: SURGERY | Age: 37
Discharge: HOME | End: 2017-06-05

## 2017-06-05 VITALS
OXYGEN SATURATION: 97 % | DIASTOLIC BLOOD PRESSURE: 74 MMHG | BODY MASS INDEX: 42.66 KG/M2 | RESPIRATION RATE: 16 BRPM | HEART RATE: 67 BPM | SYSTOLIC BLOOD PRESSURE: 121 MMHG | HEIGHT: 72 IN | WEIGHT: 315 LBS | TEMPERATURE: 97.7 F

## 2017-06-05 PROBLEM — E66.9 OBESITY: Status: ACTIVE | Noted: 2017-06-05

## 2017-06-05 NOTE — PERIOP NOTES
Patient verified name, , and surgery as listed in [facility]. TYPE  CASE:1B  Orders per surgeon: yes Received  Labs per surgeon:none  Labs per anesthesia protocol: none  EKG  :  Not needed  anesthesia to review EKGs 16 and 16. Patient provided with handouts including guide to surgery , transfusions, pain management and hand hygiene for the family and community. Pt verbalizes understanding of all pre-op instructions . Instructed that family must be present in building at all times. Nothing to eat or drink after midnight the night prior to surgery. Hibiclens and instructions given per hospital policy. Instructed patient to continue  previous medications as prescribed prior to surgery and  to take the following medications the day of surgery according to anesthesia guidelines :none       Original medication prescription bottles not visualized during patient appointment. Continue all previous medications unless otherwise directed. Instructed patient to hold  the following medications prior to surgery: none      Patient verbalized understanding of all instructions and provided all medical/health information to the best of their ability.

## 2017-06-07 ENCOUNTER — ANESTHESIA EVENT (OUTPATIENT)
Dept: SURGERY | Age: 37
End: 2017-06-07
Payer: SELF-PAY

## 2017-06-07 RX ORDER — VANCOMYCIN 2 GRAM/500 ML IN 0.9 % SODIUM CHLORIDE INTRAVENOUS
2000 ONCE
Status: COMPLETED | OUTPATIENT
Start: 2017-06-08 | End: 2017-06-08

## 2017-06-08 ENCOUNTER — HOSPITAL ENCOUNTER (OUTPATIENT)
Age: 37
Setting detail: OBSERVATION
Discharge: HOME | End: 2017-06-09
Attending: UROLOGY | Admitting: UROLOGY
Payer: SELF-PAY

## 2017-06-08 ENCOUNTER — ANESTHESIA (OUTPATIENT)
Dept: SURGERY | Age: 37
End: 2017-06-08
Payer: SELF-PAY

## 2017-06-08 PROCEDURE — 74011250637 HC RX REV CODE- 250/637: Performed by: ANESTHESIOLOGY

## 2017-06-08 PROCEDURE — 74011250636 HC RX REV CODE- 250/636: Performed by: ANESTHESIOLOGY

## 2017-06-08 PROCEDURE — 77030010545: Performed by: UROLOGY

## 2017-06-08 PROCEDURE — C1813 PROSTHESIS, PENILE, INFLATAB: HCPCS | Performed by: UROLOGY

## 2017-06-08 PROCEDURE — 99218 HC RM OBSERVATION: CPT

## 2017-06-08 PROCEDURE — 74011250636 HC RX REV CODE- 250/636: Performed by: UROLOGY

## 2017-06-08 PROCEDURE — 77030010507 HC ADH SKN DERMBND J&J -B: Performed by: UROLOGY

## 2017-06-08 PROCEDURE — 77030002888 HC SUT CHRMC J&J -A: Performed by: UROLOGY

## 2017-06-08 PROCEDURE — 74011000258 HC RX REV CODE- 258: Performed by: UROLOGY

## 2017-06-08 PROCEDURE — 77030018673: Performed by: UROLOGY

## 2017-06-08 PROCEDURE — 36415 COLL VENOUS BLD VENIPUNCTURE: CPT | Performed by: UROLOGY

## 2017-06-08 PROCEDURE — 77030020229 HC RETRCTR SCROT SYS BSC -D: Performed by: UROLOGY

## 2017-06-08 PROCEDURE — 77030019908 HC STETH ESOPH SIMS -A: Performed by: ANESTHESIOLOGY

## 2017-06-08 PROCEDURE — 74011250636 HC RX REV CODE- 250/636

## 2017-06-08 PROCEDURE — 77030018836 HC SOL IRR NACL ICUM -A: Performed by: UROLOGY

## 2017-06-08 PROCEDURE — 77030033269 HC SLV COMPR SCD KNE2 CARD -B: Performed by: UROLOGY

## 2017-06-08 PROCEDURE — 76210000016 HC OR PH I REC 1 TO 1.5 HR: Performed by: UROLOGY

## 2017-06-08 PROCEDURE — 76060000036 HC ANESTHESIA 2.5 TO 3 HR: Performed by: UROLOGY

## 2017-06-08 PROCEDURE — 77030019940 HC BLNKT HYPOTHRM STRY -B: Performed by: ANESTHESIOLOGY

## 2017-06-08 PROCEDURE — 77030008477 HC STYL SATN SLP COVD -A: Performed by: ANESTHESIOLOGY

## 2017-06-08 PROCEDURE — 76010000172 HC OR TIME 2.5 TO 3 HR INTENSV-TIER 1: Performed by: UROLOGY

## 2017-06-08 PROCEDURE — 77030011640 HC PAD GRND REM COVD -A: Performed by: UROLOGY

## 2017-06-08 PROCEDURE — 74011000250 HC RX REV CODE- 250: Performed by: UROLOGY

## 2017-06-08 PROCEDURE — 77030003029 HC SUT VCRL J&J -B: Performed by: UROLOGY

## 2017-06-08 PROCEDURE — 77030018846 HC SOL IRR STRL H20 ICUM -A: Performed by: UROLOGY

## 2017-06-08 PROCEDURE — 74011000250 HC RX REV CODE- 250

## 2017-06-08 PROCEDURE — 80170 ASSAY OF GENTAMICIN: CPT | Performed by: UROLOGY

## 2017-06-08 PROCEDURE — 77030020782 HC GWN BAIR PAWS FLX 3M -B: Performed by: ANESTHESIOLOGY

## 2017-06-08 PROCEDURE — 77030008703 HC TU ET UNCUF COVD -A: Performed by: ANESTHESIOLOGY

## 2017-06-08 PROCEDURE — 77030005520 HC CATH URETH FOL38 BARD -A: Performed by: UROLOGY

## 2017-06-08 PROCEDURE — 77030031139 HC SUT VCRL2 J&J -A: Performed by: UROLOGY

## 2017-06-08 PROCEDURE — 77030002966 HC SUT PDS J&J -A: Performed by: UROLOGY

## 2017-06-08 PROCEDURE — 77030013782 HC KT PENIL INFL AMS -C: Performed by: UROLOGY

## 2017-06-08 DEVICE — INFLATABLE PENILE PROSTHESIS, INHIBIZONE/PRECONNECTED - PENOSCROTAL 1 PUMP 2 CYLINDERS
Type: IMPLANTABLE DEVICE | Site: PENIS | Status: FUNCTIONAL
Brand: AMS 700 CX MS PUMP

## 2017-06-08 DEVICE — AMS 700 PENILE PROSTHESIS, 1 LOW PROFILE RESERVOIR, UP TO 100 ML, INHIBIZONE TREATED
Type: IMPLANTABLE DEVICE | Site: ABDOMEN | Status: FUNCTIONAL
Brand: CONCEAL

## 2017-06-08 RX ORDER — MIDAZOLAM HYDROCHLORIDE 1 MG/ML
2 INJECTION, SOLUTION INTRAMUSCULAR; INTRAVENOUS
Status: DISCONTINUED | OUTPATIENT
Start: 2017-06-08 | End: 2017-06-08 | Stop reason: HOSPADM

## 2017-06-08 RX ORDER — SODIUM CHLORIDE 9 MG/ML
50 INJECTION, SOLUTION INTRAVENOUS CONTINUOUS
Status: DISCONTINUED | OUTPATIENT
Start: 2017-06-08 | End: 2017-06-08 | Stop reason: HOSPADM

## 2017-06-08 RX ORDER — FAMOTIDINE 20 MG/1
20 TABLET, FILM COATED ORAL ONCE
Status: COMPLETED | OUTPATIENT
Start: 2017-06-08 | End: 2017-06-08

## 2017-06-08 RX ORDER — KETOROLAC TROMETHAMINE 30 MG/ML
INJECTION, SOLUTION INTRAMUSCULAR; INTRAVENOUS AS NEEDED
Status: DISCONTINUED | OUTPATIENT
Start: 2017-06-08 | End: 2017-06-08 | Stop reason: HOSPADM

## 2017-06-08 RX ORDER — ROCURONIUM BROMIDE 10 MG/ML
INJECTION, SOLUTION INTRAVENOUS AS NEEDED
Status: DISCONTINUED | OUTPATIENT
Start: 2017-06-08 | End: 2017-06-08 | Stop reason: HOSPADM

## 2017-06-08 RX ORDER — FENTANYL CITRATE 50 UG/ML
INJECTION, SOLUTION INTRAMUSCULAR; INTRAVENOUS AS NEEDED
Status: DISCONTINUED | OUTPATIENT
Start: 2017-06-08 | End: 2017-06-08 | Stop reason: HOSPADM

## 2017-06-08 RX ORDER — HYDROMORPHONE HYDROCHLORIDE 2 MG/ML
0.5 INJECTION, SOLUTION INTRAMUSCULAR; INTRAVENOUS; SUBCUTANEOUS
Status: DISCONTINUED | OUTPATIENT
Start: 2017-06-08 | End: 2017-06-08 | Stop reason: HOSPADM

## 2017-06-08 RX ORDER — ACETAMINOPHEN 500 MG
1000 TABLET ORAL
Status: DISCONTINUED | OUTPATIENT
Start: 2017-06-08 | End: 2017-06-08 | Stop reason: HOSPADM

## 2017-06-08 RX ORDER — SODIUM CHLORIDE 0.9 % (FLUSH) 0.9 %
5-10 SYRINGE (ML) INJECTION AS NEEDED
Status: DISCONTINUED | OUTPATIENT
Start: 2017-06-08 | End: 2017-06-08 | Stop reason: HOSPADM

## 2017-06-08 RX ORDER — VANCOMYCIN 2 GRAM/500 ML IN 0.9 % SODIUM CHLORIDE INTRAVENOUS
2000 EVERY 12 HOURS
Status: DISCONTINUED | OUTPATIENT
Start: 2017-06-08 | End: 2017-06-09 | Stop reason: HOSPADM

## 2017-06-08 RX ORDER — NALOXONE HYDROCHLORIDE 0.4 MG/ML
0.4 INJECTION, SOLUTION INTRAMUSCULAR; INTRAVENOUS; SUBCUTANEOUS AS NEEDED
Status: DISCONTINUED | OUTPATIENT
Start: 2017-06-08 | End: 2017-06-09 | Stop reason: HOSPADM

## 2017-06-08 RX ORDER — SODIUM CHLORIDE 0.9 % (FLUSH) 0.9 %
5-10 SYRINGE (ML) INJECTION AS NEEDED
Status: DISCONTINUED | OUTPATIENT
Start: 2017-06-08 | End: 2017-06-09 | Stop reason: HOSPADM

## 2017-06-08 RX ORDER — LIDOCAINE HYDROCHLORIDE 20 MG/ML
INJECTION, SOLUTION EPIDURAL; INFILTRATION; INTRACAUDAL; PERINEURAL AS NEEDED
Status: DISCONTINUED | OUTPATIENT
Start: 2017-06-08 | End: 2017-06-08 | Stop reason: HOSPADM

## 2017-06-08 RX ORDER — IBUPROFEN 200 MG
600 TABLET ORAL
COMMUNITY
End: 2019-05-02

## 2017-06-08 RX ORDER — NEOSTIGMINE METHYLSULFATE 1 MG/ML
INJECTION INTRAVENOUS AS NEEDED
Status: DISCONTINUED | OUTPATIENT
Start: 2017-06-08 | End: 2017-06-08 | Stop reason: HOSPADM

## 2017-06-08 RX ORDER — HYDROCODONE BITARTRATE AND ACETAMINOPHEN 5; 325 MG/1; MG/1
1 TABLET ORAL AS NEEDED
Status: DISCONTINUED | OUTPATIENT
Start: 2017-06-08 | End: 2017-06-08 | Stop reason: HOSPADM

## 2017-06-08 RX ORDER — SODIUM CHLORIDE, SODIUM LACTATE, POTASSIUM CHLORIDE, CALCIUM CHLORIDE 600; 310; 30; 20 MG/100ML; MG/100ML; MG/100ML; MG/100ML
150 INJECTION, SOLUTION INTRAVENOUS CONTINUOUS
Status: DISCONTINUED | OUTPATIENT
Start: 2017-06-08 | End: 2017-06-08 | Stop reason: HOSPADM

## 2017-06-08 RX ORDER — DEXAMETHASONE SODIUM PHOSPHATE 4 MG/ML
INJECTION, SOLUTION INTRA-ARTICULAR; INTRALESIONAL; INTRAMUSCULAR; INTRAVENOUS; SOFT TISSUE AS NEEDED
Status: DISCONTINUED | OUTPATIENT
Start: 2017-06-08 | End: 2017-06-08 | Stop reason: HOSPADM

## 2017-06-08 RX ORDER — HYDROMORPHONE HYDROCHLORIDE 1 MG/ML
.5-2 INJECTION, SOLUTION INTRAMUSCULAR; INTRAVENOUS; SUBCUTANEOUS
Status: DISCONTINUED | OUTPATIENT
Start: 2017-06-08 | End: 2017-06-09 | Stop reason: HOSPADM

## 2017-06-08 RX ORDER — FENTANYL CITRATE 50 UG/ML
100 INJECTION, SOLUTION INTRAMUSCULAR; INTRAVENOUS ONCE
Status: DISCONTINUED | OUTPATIENT
Start: 2017-06-08 | End: 2017-06-08 | Stop reason: HOSPADM

## 2017-06-08 RX ORDER — SODIUM CHLORIDE 0.9 % (FLUSH) 0.9 %
5-10 SYRINGE (ML) INJECTION EVERY 8 HOURS
Status: DISCONTINUED | OUTPATIENT
Start: 2017-06-08 | End: 2017-06-09 | Stop reason: HOSPADM

## 2017-06-08 RX ORDER — SODIUM CHLORIDE 0.9 % (FLUSH) 0.9 %
5-10 SYRINGE (ML) INJECTION EVERY 8 HOURS
Status: DISCONTINUED | OUTPATIENT
Start: 2017-06-08 | End: 2017-06-08 | Stop reason: HOSPADM

## 2017-06-08 RX ORDER — PROPOFOL 10 MG/ML
INJECTION, EMULSION INTRAVENOUS AS NEEDED
Status: DISCONTINUED | OUTPATIENT
Start: 2017-06-08 | End: 2017-06-08 | Stop reason: HOSPADM

## 2017-06-08 RX ORDER — ONDANSETRON 2 MG/ML
INJECTION INTRAMUSCULAR; INTRAVENOUS AS NEEDED
Status: DISCONTINUED | OUTPATIENT
Start: 2017-06-08 | End: 2017-06-08 | Stop reason: HOSPADM

## 2017-06-08 RX ORDER — SODIUM CHLORIDE 9 MG/ML
125 INJECTION, SOLUTION INTRAVENOUS CONTINUOUS
Status: DISCONTINUED | OUTPATIENT
Start: 2017-06-08 | End: 2017-06-09 | Stop reason: HOSPADM

## 2017-06-08 RX ORDER — LIDOCAINE HYDROCHLORIDE 10 MG/ML
0.1 INJECTION INFILTRATION; PERINEURAL AS NEEDED
Status: DISCONTINUED | OUTPATIENT
Start: 2017-06-08 | End: 2017-06-08 | Stop reason: HOSPADM

## 2017-06-08 RX ORDER — HYDROCODONE BITARTRATE AND ACETAMINOPHEN 5; 325 MG/1; MG/1
1 TABLET ORAL
Status: DISCONTINUED | OUTPATIENT
Start: 2017-06-08 | End: 2017-06-09 | Stop reason: HOSPADM

## 2017-06-08 RX ORDER — ONDANSETRON 2 MG/ML
4 INJECTION INTRAMUSCULAR; INTRAVENOUS
Status: DISCONTINUED | OUTPATIENT
Start: 2017-06-08 | End: 2017-06-09 | Stop reason: HOSPADM

## 2017-06-08 RX ORDER — ACETAMINOPHEN 325 MG/1
650 TABLET ORAL
Status: DISCONTINUED | OUTPATIENT
Start: 2017-06-08 | End: 2017-06-09 | Stop reason: HOSPADM

## 2017-06-08 RX ORDER — GLYCOPYRROLATE 0.2 MG/ML
INJECTION INTRAMUSCULAR; INTRAVENOUS AS NEEDED
Status: DISCONTINUED | OUTPATIENT
Start: 2017-06-08 | End: 2017-06-08 | Stop reason: HOSPADM

## 2017-06-08 RX ADMIN — SODIUM CHLORIDE, SODIUM LACTATE, POTASSIUM CHLORIDE, AND CALCIUM CHLORIDE: 600; 310; 30; 20 INJECTION, SOLUTION INTRAVENOUS at 12:50

## 2017-06-08 RX ADMIN — MIDAZOLAM HYDROCHLORIDE 2 MG: 1 INJECTION, SOLUTION INTRAMUSCULAR; INTRAVENOUS at 11:22

## 2017-06-08 RX ADMIN — ROCURONIUM BROMIDE 10 MG: 10 INJECTION, SOLUTION INTRAVENOUS at 12:43

## 2017-06-08 RX ADMIN — ROCURONIUM BROMIDE 40 MG: 10 INJECTION, SOLUTION INTRAVENOUS at 12:01

## 2017-06-08 RX ADMIN — FENTANYL CITRATE 50 MCG: 50 INJECTION, SOLUTION INTRAMUSCULAR; INTRAVENOUS at 12:50

## 2017-06-08 RX ADMIN — SODIUM CHLORIDE, SODIUM LACTATE, POTASSIUM CHLORIDE, AND CALCIUM CHLORIDE 150 ML/HR: 600; 310; 30; 20 INJECTION, SOLUTION INTRAVENOUS at 08:35

## 2017-06-08 RX ADMIN — PROPOFOL 200 MG: 10 INJECTION, EMULSION INTRAVENOUS at 12:01

## 2017-06-08 RX ADMIN — FENTANYL CITRATE 50 MCG: 50 INJECTION, SOLUTION INTRAMUSCULAR; INTRAVENOUS at 11:56

## 2017-06-08 RX ADMIN — GENTAMICIN SULFATE 550 MG: 40 INJECTION, SOLUTION INTRAMUSCULAR; INTRAVENOUS at 12:10

## 2017-06-08 RX ADMIN — DEXAMETHASONE SODIUM PHOSPHATE 4 MG: 4 INJECTION, SOLUTION INTRA-ARTICULAR; INTRALESIONAL; INTRAMUSCULAR; INTRAVENOUS; SOFT TISSUE at 12:18

## 2017-06-08 RX ADMIN — ROCURONIUM BROMIDE 5 MG: 10 INJECTION, SOLUTION INTRAVENOUS at 13:15

## 2017-06-08 RX ADMIN — FENTANYL CITRATE 50 MCG: 50 INJECTION, SOLUTION INTRAMUSCULAR; INTRAVENOUS at 11:54

## 2017-06-08 RX ADMIN — SODIUM CHLORIDE 125 ML/HR: 900 INJECTION, SOLUTION INTRAVENOUS at 20:00

## 2017-06-08 RX ADMIN — KETOROLAC TROMETHAMINE 30 MG: 30 INJECTION, SOLUTION INTRAMUSCULAR; INTRAVENOUS at 14:11

## 2017-06-08 RX ADMIN — GLYCOPYRROLATE 0.4 MG: 0.2 INJECTION INTRAMUSCULAR; INTRAVENOUS at 14:09

## 2017-06-08 RX ADMIN — Medication 10 ML: at 22:00

## 2017-06-08 RX ADMIN — HYDROMORPHONE HYDROCHLORIDE 0.5 MG: 2 INJECTION, SOLUTION INTRAMUSCULAR; INTRAVENOUS; SUBCUTANEOUS at 15:31

## 2017-06-08 RX ADMIN — VANCOMYCIN HYDROCHLORIDE 2000 MG: 10 INJECTION, POWDER, LYOPHILIZED, FOR SOLUTION INTRAVENOUS at 08:48

## 2017-06-08 RX ADMIN — VANCOMYCIN HYDROCHLORIDE 2000 MG: 10 INJECTION, POWDER, LYOPHILIZED, FOR SOLUTION INTRAVENOUS at 21:00

## 2017-06-08 RX ADMIN — FENTANYL CITRATE 50 MCG: 50 INJECTION, SOLUTION INTRAMUSCULAR; INTRAVENOUS at 13:14

## 2017-06-08 RX ADMIN — LIDOCAINE HYDROCHLORIDE 60 MG: 20 INJECTION, SOLUTION EPIDURAL; INFILTRATION; INTRACAUDAL; PERINEURAL at 12:01

## 2017-06-08 RX ADMIN — FAMOTIDINE 20 MG: 20 TABLET, FILM COATED ORAL at 08:42

## 2017-06-08 RX ADMIN — ONDANSETRON 4 MG: 2 INJECTION INTRAMUSCULAR; INTRAVENOUS at 14:03

## 2017-06-08 RX ADMIN — ROCURONIUM BROMIDE 10 MG: 10 INJECTION, SOLUTION INTRAVENOUS at 12:48

## 2017-06-08 RX ADMIN — NEOSTIGMINE METHYLSULFATE 3 MG: 1 INJECTION INTRAVENOUS at 14:09

## 2017-06-08 NOTE — PROGRESS NOTES
Pharmacokinetic Consult to Pharmacist    Sandysushma Lema is a 43 y.o. male being treated for surgical prophylaxis with vancomycin and gentamicin. Height: 6' (182.9 cm)  Weight: 158.4 kg (349 lb 2 oz)  Lab Results   Component Value Date/Time    BUN 15 01/27/2017 09:23 AM    Creatinine 0.88 01/27/2017 09:23 AM    WBC 5.5 01/27/2017 09:23 AM      CrCl cannot be calculated (Patient has no serum creatinine result on file.). CULTURES:  na    Day 1 of vancomycin. Goal trough is 15-20. Vancomycin dose initiated at 2000 mg IV q12h. Levels will be ordered as clinically indicated. Day 1 of gentamicin. Dosing started at 560 mg (5 mg/kg Adj BW) IV q24h. Will assess dosing frequency with 10 hour random level and [place] nomogram.     Pharmacy will continue to follow. Please call with any questions.       Thank you,  Delmer Rodarte, PharmD  Clinical Pharmacist  [phone]

## 2017-06-08 NOTE — OP NOTES
[address]   OPERATIVE REPORT       Name:  Kamille Willis   MR#:  [id number]   :  1980   Account #:  [de-identified]   Date of Adm:  2017       DATE OF SURGERY: 2017    PREOPERATIVE DIAGNOSES:    1. Erectile dysfunction. 2. Morbid obesity. POSTOPERATIVE DIAGNOSES:    1. Erectile dysfunction. 2. Morbid obesity. PROCEDURE: Placement of 3-piece inflatable penile prosthesis. SURGEON: Risa Perla. Adrienne Rivers MD    DESCRIPTION OF PROCEDURE: The patient was given a general   anesthetic, placed in the supine position. His abdomen and   genitals were shaved and prepped and draped in sterile fashion   with 3 separate solutions with a 10-minute scrub. He was draped   in a sterile fashion including an Ioban drape. We placed a Al   catheter and left it indwelling. A [place] retractor was used. We placed a hook through the   penile meatus and affixed this to the retractor with the penis   on cephalad stretch over a silicone band attached to the   retractor edge. A penoscrotal incision was made. Blunt hooks   were used on the tissue edges for exposure. Physical exam shows that the patient has a very large pannus. His obesity did affect our surgery and made it moderately   difficult to dilate and measure the corpora. I dissected proximally through the subcutaneous tissue until the   bulbar urethra was noted. We then dissected on the proximal   corpora. I made a corporotomy on the left proximal corpus using   the Bovie. I was able to pass an 8-Brazilian Ibarra dilator   proximally and distally. I then placed stay sutures of 2-0 PDS   on either side of the incision. The procedure was repeated on   the right side, again using the Bovie for corporotomy on the   right proximal corpus, able to pass the 8-Brazilian dilator   carefully and corporotomy stay sutures were placed as well. We began to dilate the corpora with [facility] dilators from 8 mm up   to 13 mm.  I took great care on the proximal dilation not to   produce a perforation of the proximal corpus. This was difficult   because the ischial bone was very deep and I had to palpate this   with my hand as I passed the dilators. We dilated proximally and   distally and then used the [facility] measuring device. We initially   measured 13 cm proximal and 10 distal on the right. The left   side did not measure that much. I then dilated again and found   that I could place both Dawn Docker dilators on the left and right   corpora and both of them appeared to go and the same length   indicating that there was no perforation. The measurements as   noted were 13 cm proximal and 10 distal on each side. We chose   an 18 cm CX inflatable penile prosthesis with a 5 cm rear tip   extender on each side. A 100 mL Conceal reservoir was placed in the space of Retzius by   piercing the floor of the inguinal canal on the right side and   then placing it behind the pubis. This was inflated with 100 mL   of normal saline. We then placed the previously noted device using the [facility]   insertion device. I had to open the corporotomies with the Bovie   in order to the get the device to go in easily. I used the   plastic pusher to push the device more proximally, I was able to   fit the device in the corpora. A test inflation was made by connecting a syringe containing 60   mL of normal saline to the pump. We then pumped it and while   pumping pushed the device up into the proximal corpora, I saw   good distal placement of the cylinders into the mid glans on   each side and no evidence of any buckling or aneurysm. Proximally I could palpate good placement and an equal symmetric   placement into the proximal corpora. The device was deflated. The corporotomies were closed by tying   the PDS sutures across the midline. The suture was removed from   the glans.  At this point, the pump was placed into the subdartos   position in the right hemiscrotum. It should be noted that we   had irrigated the corpora and all operative areas with   bacitracin containing solution. Connection was made between the reservoir and the pump using the   quick connect device. The scrotum was closed in 2 layers of   running 3-0 Vicryl, skin closed using interrupted 3-0 chromic   along with Dermabond. I did a crisscross dressing across the   scrotum using foam tape for hemostasis. Al was left   indwelling with clear urine. He was taken to the recovery room   in stable condition. There were no complications. PLAN: He will be kept for observation and antibiotics.         MD Robyn Baker Crew / Rachael Claros   D:  06/08/2017   14:41   T:  06/08/2017   15:15   Job #:  [id number]

## 2017-06-08 NOTE — BRIEF OP NOTE
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  Item Serial No.  Lot No. LRB No. Used Action    accessory kit    M[employer] Row [id number] N/A 1 Implanted   RESERVIOR FLAT PROS IZ 100M --  - [id number]  RESERVIOR FLAT PROS IZ 100M --   [employer] [facility] [id number] N/A 1 Implanted   CYL PUMP PENILE CX MS PS --  - [id number]  CYL PUMP PENILE CX MS PS --   [employer] [facility] [id number] N/A 1 Implanted   rear tip extenter 5.0       M[employer] Row [id number] N/A 1 Implanted

## 2017-06-08 NOTE — ANESTHESIA PREPROCEDURE EVALUATION
Anesthetic History   No history of anesthetic complications            Review of Systems / Medical History  Patient summary reviewed, nursing notes reviewed and pertinent labs reviewed    Pulmonary        Sleep apnea: No treatment           Neuro/Psych         Headaches     Cardiovascular    Hypertension: well controlled              Exercise tolerance: >4 METS     GI/Hepatic/Renal     GERD: well controlled           Endo/Other        Morbid obesity     Other Findings   Comments: After gastric sleeve lost 140 pounds and no longer has reflux or diabetes.            Physical Exam    Airway  Mallampati: II  TM Distance: 4 - 6 cm  Neck ROM: normal range of motion   Mouth opening: Normal    Comments: Full beard Cardiovascular    Rhythm: regular  Rate: normal         Dental  No notable dental hx       Pulmonary  Breath sounds clear to auscultation               Abdominal         Other Findings            Anesthetic Plan    ASA: 3  Anesthesia type: general          Induction: Intravenous and RSI  Anesthetic plan and risks discussed with: Patient and Spouse

## 2017-06-08 NOTE — PROGRESS NOTES
TRANSFER - IN REPORT:    Verbal report received from   [facility] NEILon Abel Gearing  being received from pacu(unit) for routine progression of care         Report consisted of patients Situation, Background, Assessment and   Recommendations(SBAR). Information from the following report(s) Kardex, MAR and Recent Results was reviewed with the receiving nurse. Opportunity for questions and clarification was provided. Assessment completed upon patients arrival to unit and care assumed.

## 2017-06-08 NOTE — PERIOP NOTES
TRANSFER - OUT REPORT:    Verbal report given to Venu Hagan on Venkat Gallegos  being transferred to [address] [place] [address] for routine post - op       Report consisted of patients Situation, Background, Assessment and   Recommendations(SBAR). Information from the following report(s) SBAR, Procedure Summary, Intake/Output, MAR, Recent Results and Cardiac Rhythm NSR was reviewed with the receiving nurse. Lines:   Peripheral IV 06/08/17 Left Forearm (Active)   Site Assessment Clean, dry, & intact 6/8/2017  3:44 PM   Phlebitis Assessment 0 6/8/2017  3:44 PM   Infiltration Assessment 0 6/8/2017  3:44 PM   Dressing Status Clean, dry, & intact 6/8/2017  3:44 PM   Dressing Type Tape;Transparent 6/8/2017  3:44 PM   Hub Color/Line Status Pink; Infusing 6/8/2017  3:44 PM        Opportunity for questions and clarification was provided. VTE prophylaxis orders have been written for Venkat Gallegos. SCDs in place. Patient and family given floor number and nurses name. Family updated re: pt status after security code verified. Pt's wife left for the evening. Pt has his cell phone and called his wife to let her know room number.

## 2017-06-08 NOTE — ANESTHESIA POSTPROCEDURE EVALUATION
Post-Anesthesia Evaluation and Assessment    Patient: Venkat Gallegos MRN: [id number]  SSN: [id number]    YOB: 1980  Age: 43 y.o. Sex: male       Cardiovascular Function/Vital Signs  Visit Vitals    /74    Pulse (!) 59    Temp 36.7 °C (98.1 °F)    Resp 14    Ht 6' (1.829 m)    Wt 158.4 kg (349 lb 2 oz)    SpO2 97%    BMI 47.35 kg/m2       Patient is status post general anesthesia for Procedure(s):  PENILE PROSTHESIS INSERTION. Nausea/Vomiting: None    Postoperative hydration reviewed and adequate. Pain:  Pain Scale 1: Numeric (0 - 10) (06/08/17 0842)  Pain Intensity 1: 0 (06/08/17 0842)   Managed    Neurological Status:   Neuro (WDL): Within Defined Limits (06/08/17 0856)   At baseline    Mental Status and Level of Consciousness: Arousable    Pulmonary Status:   O2 Device: Nasal cannula (06/08/17 1437)   Adequate oxygenation and airway patent    Complications related to anesthesia: None    Post-anesthesia assessment completed.  No concerns    Signed By: Lizeth Crump MD     June 8, 2017

## 2017-06-08 NOTE — PROGRESS NOTES
Spiritual Care visit. Initial Visit, Pre Surgery Consult. Visit and prayer before patient goes to surgery.     Visit by Moraima Nunez M.Ed., Th.B. ,Staff

## 2017-06-08 NOTE — IP AVS SNAPSHOT
[facility] 
 
 
 [address] [facility] 
[phone] Patient: Honey Kebede MRN: [id number] UHT:47/95/2448 You are allergic to the following Allergen Reactions Morphine Other (comments) \"react strongly to it and was out of it for 2 days\" Pcn (Penicillins) Rash Recent Documentation Height Weight BMI Smoking Status 1.829 m 158.4 kg 47.35 kg/m2 Never Smoker Emergency Contacts Name Discharge Info Relation Home Work Mobile Celestino Valladares  Spouse [3] [phone] About your hospitalization You were admitted on:  June 8, 2017 You last received care in the:  [facility] 6 MED SURG You were discharged on:  June 9, 2017 Unit phone number:  [phone] Why you were hospitalized Your primary diagnosis was: Impotence Of Organic Origin Your diagnoses also included: Morbid Obesity (Hcc) Providers Seen During Your Hospitalizations Provider Role Specialty Primary office phone Leia José MD Attending Provider Urology [phone] Your Primary Care Physician (PCP) Primary Care Physician Office Phone Office Fax Gustavo Oiler [phone] Follow-up Information Follow up With Details Comments Contact Info Laurita Morillo MD   [address] [place] [address] 100 5 [facility] [address] 
[phone] TRAMAINE Proctor On 6/20/2017 at [address] [address] [phone] Your Appointments Tuesday June 20, 2017  9:15 AM EDT Office Visit with TRAMAINE Proctor [facility] Urology 50 ([address] [facility] UROLOGY) [address] [place] [address]  
[phone] Thursday July 06, 2017  1:55 PM EDT Office Visit with MD Trista [address] ENT [address] [facility] ENT) [address] [address] [phone] Current Discharge Medication List  
  
START taking these medications Dose & Instructions Dispensing Information Comments Morning Noon Evening Bedtime HYDROcodone-acetaminophen 7.5-325 mg per tablet Commonly known as:  Jose Julio C Your last dose was:  07:48 am  
  
Your next dose is:  Every 6 hours as needed. Dose:  1-2 Tab Take 1-2 Tabs by mouth every six (6) hours as needed for Pain. Max Daily Amount: 8 Tabs. Quantity:  20 Tab Refills:  0 CONTINUE these medications which have NOT CHANGED Dose & Instructions Dispensing Information Comments Morning Noon Evening Bedtime  
 ciprofloxacin HCl 500 mg tablet Commonly known as:  CIPRO Your next dose is: This evening Dose:  500 mg Take 1 Tab by mouth two (2) times a day for 20 days. Quantity:  40 Tab Refills:  0  
     
  
   
   
  
   
  
 ibuprofen 200 mg tablet Commonly known as:  MOTRIN Your next dose is:  As needed. Dose:  600 mg Take 600 mg by mouth every six (6) hours as needed for Pain. Refills:  0  
     
   
   
   
  
 trimethoprim-sulfamethoxazole 160-800 mg per tablet Commonly known as:  BACTRIM DS Your next dose is: This evening Dose:  1 Tab Take 1 Tab by mouth two (2) times a day. Quantity:  40 Tab Refills:  0 Where to Get Your Medications Information on where to get these meds will be given to you by the nurse or doctor. ! Ask your nurse or doctor about these medications HYDROcodone-acetaminophen 7.5-325 mg per tablet Discharge Instructions DISCHARGE SUMMARY from Nurse The following personal items are in your possession at time of discharge: 
 
Dental Appliances: None Visual Aid: None Hearing Aids/Status: Does not own Home Medications: None Jewelry: None Clothing: Footwear, Pants, Shirt, Undergarments Other Valuables: None Personal Items Sent to Safe:  (none) PATIENT INSTRUCTIONS: 
 
After general anesthesia or intravenous sedation, for 24 hours or while taking prescription Narcotics: · Limit your activities · Do not drive and operate hazardous machinery · Do not make important personal or business decisions · Do  not drink alcoholic beverages · If you have not urinated within 8 hours after discharge, please contact your surgeon on call. Report the following to your surgeon: 
· Excessive pain, swelling, redness or odor of or around the surgical area · Temperature over 100.5 · Nausea and vomiting lasting longer than 4 hours or if unable to take medications · Any signs of decreased circulation or nerve impairment to extremity: change in color, persistent  numbness, tingling, coldness or increase pain · Any questions What to do at Home: 
Recommended activity: Activity as tolerated, no driving while taking pain medication, continue to wear scrotal support and change gauze daily and as needed. If you experience any of the following symptoms fever greater than 101, new or unrelieved pain, complications at incision site (redness, warmth, swelling or pus like drainage) or difficulty/inability to urinate, please follow up with Dr. Nelson Mclean. *  Please give a list of your current medications to your Primary Care Provider. *  Please update this list whenever your medications are discontinued, doses are 
    changed, or new medications (including over-the-counter products) are added. *  Please carry medication information at all times in case of emergency situations. These are general instructions for a healthy lifestyle: No smoking/ No tobacco products/ Avoid exposure to second hand smoke Surgeon General's Warning:  Quitting smoking now greatly reduces serious risk to your health. Obesity, smoking, and sedentary lifestyle greatly increases your risk for illness A healthy diet, regular physical exercise & weight monitoring are important for maintaining a healthy lifestyle You may be retaining fluid if you have a history of heart failure or if you experience any of the following symptoms:  Weight gain of 3 pounds or more overnight or 5 pounds in a week, increased swelling in our hands or feet or shortness of breath while lying flat in bed. Please call your doctor as soon as you notice any of these symptoms; do not wait until your next office visit. Recognize signs and symptoms of STROKE: 
 
F-face looks uneven A-arms unable to move or move unevenly S-speech slurred or non-existent T-time-call 911 as soon as signs and symptoms begin-DO NOT go Back to bed or wait to see if you get better-TIME IS BRAIN. Warning Signs of HEART ATTACK Call 911 if you have these symptoms: 
? Chest discomfort. Most heart attacks involve discomfort in the center of the chest that lasts more than a few minutes, or that goes away and comes back. It can feel like uncomfortable pressure, squeezing, fullness, or pain. ? Discomfort in other areas of the upper body. Symptoms can include pain or discomfort in one or both arms, the back, neck, jaw, or stomach. ? Shortness of breath with or without chest discomfort. ? Other signs may include breaking out in a cold sweat, nausea, or lightheadedness. Don't wait more than five minutes to call [address]! Fast action can save your life. Calling 911 is almost always the fastest way to get lifesaving treatment. Emergency Medical Services staff can begin treatment when they arrive  up to an hour sooner than if someone gets to the hospital by car. The discharge information has been reviewed with the patient. The patient verbalized understanding. Discharge medications reviewed with the patient and appropriate educational materials and side effects teaching were provided. Discharge Orders None Introducing [facility] & HE[facility]! Dear Maeve Dalal: Thank you for requesting a [employer] account. Our records indicate that you already have an active [employer] account. You can access your account anytime at [email or web address]. Inside [employer] Did you know that you can access your hospital and ER discharge instructions at any time in [employer]? You can also review all of your test results from your hospital stay or ER visit. Additional Information If you have questions, please visit the Frequently Asked Questions section of the [employer] website at [email or web address]/. Remember, [employer] is NOT to be used for urgent needs. For medical emergencies, dial 911. Now available from your iPhone and Android! General Information Please provide this summary of care documentation to your next provider. Patient Signature:  ____________________________________________________________ Date:  ____________________________________________________________  
  
[facility] Provider Signature:  ____________________________________________________________ Date:  ____________________________________________________________

## 2017-06-08 NOTE — PROGRESS NOTES
Pt arrived to room 629, via transport. orientated to room, call bell given. Admission assessment completed. Dressing to scotum C/D/I.  scd ,s placed, IVF started. [place] and coffee given as requested. Spray ordered for sore throat. Pizza delivered per pts order. Will cont to monitor.

## 2017-06-09 VITALS
TEMPERATURE: 98.4 F | RESPIRATION RATE: 24 BRPM | BODY MASS INDEX: 42.66 KG/M2 | WEIGHT: 315 LBS | SYSTOLIC BLOOD PRESSURE: 118 MMHG | HEART RATE: 70 BPM | HEIGHT: 72 IN | DIASTOLIC BLOOD PRESSURE: 68 MMHG | OXYGEN SATURATION: 92 %

## 2017-06-09 LAB — GENTAMICIN SERPL-MCNC: 1.3 UG/ML

## 2017-06-09 PROCEDURE — 74011250637 HC RX REV CODE- 250/637: Performed by: UROLOGY

## 2017-06-09 PROCEDURE — 99218 HC RM OBSERVATION: CPT

## 2017-06-09 PROCEDURE — 74011250636 HC RX REV CODE- 250/636: Performed by: UROLOGY

## 2017-06-09 RX ORDER — HYDROCODONE BITARTRATE AND ACETAMINOPHEN 7.5; 325 MG/1; MG/1
1-2 TABLET ORAL
Qty: 20 TAB | Refills: 0 | Status: SHIPPED | OUTPATIENT
Start: 2017-06-09 | End: 2017-06-14 | Stop reason: SDUPTHER

## 2017-06-09 RX ADMIN — SODIUM CHLORIDE 125 ML/HR: 900 INJECTION, SOLUTION INTRAVENOUS at 06:19

## 2017-06-09 RX ADMIN — CHLORASEPTIC 1 SPRAY: 1.5 LIQUID ORAL at 03:50

## 2017-06-09 RX ADMIN — VANCOMYCIN HYDROCHLORIDE 2000 MG: 10 INJECTION, POWDER, LYOPHILIZED, FOR SOLUTION INTRAVENOUS at 08:24

## 2017-06-09 RX ADMIN — HYDROCODONE BITARTRATE AND ACETAMINOPHEN 1 TABLET: 5; 325 TABLET ORAL at 07:48

## 2017-06-09 RX ADMIN — Medication 10 ML: at 06:19

## 2017-06-09 NOTE — PROGRESS NOTES
Pt resting quietly in bed. Denies needs or concerns at this time. Alert and oriented x4. Lungs sounds clear bilaterally with respirations even and unlabored. Bowel sounds active in all quadrants. +2 pulses bilaterally in upper and lower extremities. Al in place draining clear, yellow urine. IV infusing without signs of infiltration or phlebitis. Instructed to call for assistance. Call light in reach. Voiced understanding and identified call light.

## 2017-06-09 NOTE — PROGRESS NOTES
Subjective:   Daily Progress Note: 2017 7:21 AM  No Complaints  Objective:     Visit Vitals    /65 (BP 1 Location: Right arm, BP Patient Position: At rest)    Pulse 72    Temp 97.3 °F (36.3 °C)    Resp 20    Ht 6' (1.829 m)    Wt 349 lb 2 oz (158.4 kg)    SpO2 95%    BMI 47.35 kg/m2    O2 Flow Rate (L/min): 3 l/min O2 Device: Nasal cannula    Temp (24hrs), Av.8 °F (36.6 °C), Min:97.1 °F (36.2 °C), Max:98.6 °F (37 °C)       1901 -  0700  In: 8814 [P.O.:320; I.V.:1500]  Out: 200 [Urine:1850]       [unfilled]  [unfilled]  [unfilled]    Exam: scrotum without hematoma. 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 appt in 10 days.

## 2017-06-09 NOTE — PROGRESS NOTES
Discharge instructions and prescriptions provided and explained to patient, patient voiced understanding. Medication side effect sheet reviewed with pt. No home meds or valuables to return. Opportunity for questions provided. Pt to be discharged home after receiving IV Vanc and voiding trial.     Scrotal support and guaze provided and education completed.

## 2017-06-09 NOTE — DISCHARGE INSTRUCTIONS
DISCHARGE SUMMARY from Nurse    The following personal items are in your possession at time of discharge:    Dental Appliances: None  Visual Aid: None  Hearing Aids/Status: Does not own  Home Medications: None  Jewelry: None  Clothing: Footwear, Pants, Shirt, Undergarments  Other Valuables: None  Personal Items Sent to Safe:  (none)          PATIENT INSTRUCTIONS:    After general anesthesia or intravenous sedation, for 24 hours or while taking prescription Narcotics:  · Limit your activities  · Do not drive and operate hazardous machinery  · Do not make important personal or business decisions  · Do  not drink alcoholic beverages  · If you have not urinated within 8 hours after discharge, please contact your surgeon on call. Report the following to your surgeon:  · Excessive pain, swelling, redness or odor of or around the surgical area  · Temperature over 100.5  · Nausea and vomiting lasting longer than 4 hours or if unable to take medications  · Any signs of decreased circulation or nerve impairment to extremity: change in color, persistent  numbness, tingling, coldness or increase pain  · Any questions        What to do at Home:  Recommended activity: Activity as tolerated, no driving while taking pain medication, continue to wear scrotal support and change gauze daily and as needed. If you experience any of the following symptoms fever greater than 101, new or unrelieved pain, complications at incision site (redness, warmth, swelling or pus like drainage) or difficulty/inability to urinate, please follow up with Dr. Olivia Messina. *  Please give a list of your current medications to your Primary Care Provider. *  Please update this list whenever your medications are discontinued, doses are      changed, or new medications (including over-the-counter products) are added. *  Please carry medication information at all times in case of emergency situations.           These are general instructions for a healthy lifestyle:    No smoking/ No tobacco products/ Avoid exposure to second hand smoke    Surgeon General's Warning:  Quitting smoking now greatly reduces serious risk to your health. Obesity, smoking, and sedentary lifestyle greatly increases your risk for illness    A healthy diet, regular physical exercise & weight monitoring are important for maintaining a healthy lifestyle    You may be retaining fluid if you have a history of heart failure or if you experience any of the following symptoms:  Weight gain of 3 pounds or more overnight or 5 pounds in a week, increased swelling in our hands or feet or shortness of breath while lying flat in bed. Please call your doctor as soon as you notice any of these symptoms; do not wait until your next office visit. Recognize signs and symptoms of STROKE:    F-face looks uneven    A-arms unable to move or move unevenly    S-speech slurred or non-existent    T-time-call 911 as soon as signs and symptoms begin-DO NOT go       Back to bed or wait to see if you get better-TIME IS BRAIN. Warning Signs of HEART ATTACK     Call 911 if you have these symptoms:   Chest discomfort. Most heart attacks involve discomfort in the center of the chest that lasts more than a few minutes, or that goes away and comes back. It can feel like uncomfortable pressure, squeezing, fullness, or pain.  Discomfort in other areas of the upper body. Symptoms can include pain or discomfort in one or both arms, the back, neck, jaw, or stomach.  Shortness of breath with or without chest discomfort.  Other signs may include breaking out in a cold sweat, nausea, or lightheadedness. Don't wait more than five minutes to call 911 - MINUTES MATTER! Fast action can save your life. Calling 911 is almost always the fastest way to get lifesaving treatment. Emergency Medical Services staff can begin treatment when they arrive -- up to an hour sooner than if someone gets to the hospital by car. The discharge information has been reviewed with the patient. The patient verbalized understanding. Discharge medications reviewed with the patient and appropriate educational materials and side effects teaching were provided.

## 2017-12-27 PROBLEM — G44.82 COITAL HEADACHE: Status: ACTIVE | Noted: 2017-12-27

## 2017-12-27 PROBLEM — G43.109 MIGRAINE WITH AURA AND WITHOUT STATUS MIGRAINOSUS, NOT INTRACTABLE: Status: ACTIVE | Noted: 2017-12-27

## 2017-12-27 PROBLEM — Z79.899 ENCOUNTER FOR MEDICATION MANAGEMENT: Status: ACTIVE | Noted: 2017-12-27

## (undated) DEVICE — 3M™ COBAN™ NL STERILE NON-LATEX SELF-ADHERENT WRAP, 2082S, 2 IN X 5 YD (5 CM X 4,5 M), 36 ROLLS/CASE: Brand: 3M™ COBAN™

## (undated) DEVICE — CATHETER URETH 16FR BLLN 5CC SIL ALLY W/ SIL HYDRGEL 2 W F

## (undated) DEVICE — BUTTON SWITCH PENCIL BLADE ELECTRODE, HOLSTER: Brand: EDGE

## (undated) DEVICE — 1840 FOAM BLOCK NEEDLE COUNTER: Brand: DEVON

## (undated) DEVICE — SOLUTION IV 1000ML 0.9% SOD CHL

## (undated) DEVICE — SYR 50ML LR LCK 1ML GRAD NSAF --

## (undated) DEVICE — DRAPE TWL SURG 16X26IN BLU ORB04] ALLCARE INC]

## (undated) DEVICE — COVER,MAYO STAND,STERILE: Brand: MEDLINE

## (undated) DEVICE — SUT ETHLN 3-0 18IN FS1 BLK --

## (undated) DEVICE — CATHETER F BLLN 5CC 16FR 2 W HYDRGEL COAT LESS TRAUM LUB

## (undated) DEVICE — (D)SYR 10ML 1/5ML GRAD NSAF -- PKGING CHANGE USE ITEM 338027

## (undated) DEVICE — BLADE ASSEMB CLP HAIR FINE --

## (undated) DEVICE — SYR LR LCK 1ML GRAD NSAF 30ML --

## (undated) DEVICE — SMALL BOWL SET-LF: Brand: MEDLINE INDUSTRIES, INC.

## (undated) DEVICE — KENDALL SCD EXPRESS SLEEVES, KNEE LENGTH, LARGE: Brand: KENDALL SCD

## (undated) DEVICE — SOL ANTI-FOG 6ML MEDC -- MEDICHOICE - CONVERT TO 358427

## (undated) DEVICE — PLASMABLADE PS300-004 SUCT COAG BLA 16PK: Brand: PLASMABLADE™

## (undated) DEVICE — REM POLYHESIVE ADULT PATIENT RETURN ELECTRODE: Brand: VALLEYLAB

## (undated) DEVICE — GOWN,REINFORCED,POLY,AURORA,XXLARGE,STR: Brand: MEDLINE

## (undated) DEVICE — 2000CC GUARDIAN II: Brand: GUARDIAN

## (undated) DEVICE — PLASMABLADE PS300-002 TNA TIP RD: Brand: PLASMABLADE™

## (undated) DEVICE — SUTURE CHROMIC GUT SZ 5-0 L18IN ABSRB BRN P-3 L13MM 3/8 CIR 687G

## (undated) DEVICE — SPONGE: SPECIALTY PEANUT XR 100/CS: Brand: MEDICAL ACTION INDUSTRIES

## (undated) DEVICE — INTENDED FOR TISSUE SEPARATION, AND OTHER PROCEDURES THAT REQUIRE A SHARP SURGICAL BLADE TO PUNCTURE OR CUT.: Brand: BARD-PARKER ® STAINLESS STEEL BLADES

## (undated) DEVICE — SUTURE PDS II SZ 2-0 L27IN ABSRB VLT L26MM CT-2 1/2 CIR Z333H

## (undated) DEVICE — SOLUTION IRRIG 1000ML H2O STRL BLT

## (undated) DEVICE — SHEET, T, LAPAROTOMY, STERILE: Brand: MEDLINE

## (undated) DEVICE — ELECTROSURGICAL SUCTION COAGULATOR 10FR

## (undated) DEVICE — DERMABOND SKIN ADH 0.7ML -- DERMABOND ADVANCED 12/BX

## (undated) DEVICE — SPLINT NSL SEPTAL SUPP REG PRE PUNCHED HOLE SIL STRL BRTH EZ

## (undated) DEVICE — SURGICAL PROCEDURE PACK BASIC ST FRANCIS

## (undated) DEVICE — JELLY LUBRICATING 10GM PREFIL SYR LUBE

## (undated) DEVICE — BAG DRNGE 4000ML CONT IRRIG ROUNDED TEARDROP SHP DISP

## (undated) DEVICE — (D)PREP SKN CHLRAPRP APPL 26ML -- CONVERT TO ITEM 371833

## (undated) DEVICE — SUT CHRMC 3-0 27IN CT2 BRN --

## (undated) DEVICE — SUTURE VCRL SZ 3-0 L27IN ABSRB UD L26MM CT-2 1/2 CIR J232H

## (undated) DEVICE — TRAY PREP DRY W/ PREM GLV 2 APPL 6 SPNG 2 UNDPD 1 OVERWRAP

## (undated) DEVICE — 3M™ IOBAN™ 2 ANTIMICROBIAL INCISE DRAPE 6650EZ: Brand: IOBAN™ 2

## (undated) DEVICE — SUTURE VCRL SZ 4-0 L18IN ABSRB VLT L26MM SH 1/2 CIR J773D

## (undated) DEVICE — T AND A ORAL: Brand: MEDLINE INDUSTRIES, INC.

## (undated) DEVICE — SPONGE: SPECIALTY TONSIL XR MED 100/CS: Brand: MEDICAL ACTION INDUSTRIES

## (undated) DEVICE — DEVICE ERECTILE RESTR FOR PENILE PROS